# Patient Record
Sex: FEMALE
[De-identification: names, ages, dates, MRNs, and addresses within clinical notes are randomized per-mention and may not be internally consistent; named-entity substitution may affect disease eponyms.]

---

## 2021-02-04 ENCOUNTER — TRANSCRIPTION ENCOUNTER (OUTPATIENT)
Age: 26
End: 2021-02-04

## 2021-05-07 PROBLEM — Z00.00 ENCOUNTER FOR PREVENTIVE HEALTH EXAMINATION: Status: ACTIVE | Noted: 2021-05-07

## 2021-05-10 ENCOUNTER — APPOINTMENT (OUTPATIENT)
Dept: OBGYN | Facility: CLINIC | Age: 26
End: 2021-05-10
Payer: COMMERCIAL

## 2021-05-10 ENCOUNTER — NON-APPOINTMENT (OUTPATIENT)
Age: 26
End: 2021-05-10

## 2021-05-10 VITALS
WEIGHT: 160 LBS | SYSTOLIC BLOOD PRESSURE: 130 MMHG | DIASTOLIC BLOOD PRESSURE: 74 MMHG | BODY MASS INDEX: 26.66 KG/M2 | HEIGHT: 65 IN

## 2021-05-10 DIAGNOSIS — N93.9 ABNORMAL UTERINE AND VAGINAL BLEEDING, UNSPECIFIED: ICD-10-CM

## 2021-05-10 DIAGNOSIS — R10.2 PELVIC AND PERINEAL PAIN: ICD-10-CM

## 2021-05-10 DIAGNOSIS — F17.200 NICOTINE DEPENDENCE, UNSPECIFIED, UNCOMPLICATED: ICD-10-CM

## 2021-05-10 PROCEDURE — 87210 SMEAR WET MOUNT SALINE/INK: CPT | Mod: QW

## 2021-05-10 PROCEDURE — 99385 PREV VISIT NEW AGE 18-39: CPT

## 2021-05-10 PROCEDURE — 99072 ADDL SUPL MATRL&STAF TM PHE: CPT

## 2021-05-10 RX ORDER — TERCONAZOLE 80 MG/1
80 SUPPOSITORY VAGINAL
Qty: 1 | Refills: 3 | Status: ACTIVE | COMMUNITY
Start: 2021-05-10 | End: 1900-01-01

## 2021-05-11 PROBLEM — R10.2 PELVIC PAIN: Status: ACTIVE | Noted: 2021-05-10

## 2021-05-11 PROBLEM — N93.9 ABNORMAL UTERINE BLEEDING (AUB): Status: ACTIVE | Noted: 2021-05-10

## 2021-05-12 DIAGNOSIS — B96.89 ACUTE VAGINITIS: ICD-10-CM

## 2021-05-12 DIAGNOSIS — N76.0 ACUTE VAGINITIS: ICD-10-CM

## 2021-05-12 LAB
CANDIDA VAG CYTO: DETECTED
G VAGINALIS+PREV SP MTYP VAG QL MICRO: DETECTED
T VAGINALIS VAG QL WET PREP: NOT DETECTED

## 2021-05-12 RX ORDER — METRONIDAZOLE 500 MG/1
500 TABLET ORAL TWICE DAILY
Qty: 14 | Refills: 0 | Status: ACTIVE | COMMUNITY
Start: 2021-05-12 | End: 1900-01-01

## 2021-05-14 LAB — CYTOLOGY CVX/VAG DOC THIN PREP: ABNORMAL

## 2021-05-17 ENCOUNTER — APPOINTMENT (OUTPATIENT)
Dept: OBGYN | Facility: CLINIC | Age: 26
End: 2021-05-17
Payer: COMMERCIAL

## 2021-05-17 ENCOUNTER — ASOB RESULT (OUTPATIENT)
Age: 26
End: 2021-05-17

## 2021-05-17 PROCEDURE — 76830 TRANSVAGINAL US NON-OB: CPT

## 2021-05-17 PROCEDURE — 99072 ADDL SUPL MATRL&STAF TM PHE: CPT

## 2021-05-26 ENCOUNTER — APPOINTMENT (OUTPATIENT)
Dept: OBGYN | Facility: CLINIC | Age: 26
End: 2021-05-26
Payer: COMMERCIAL

## 2021-05-26 VITALS
HEIGHT: 65 IN | WEIGHT: 158 LBS | DIASTOLIC BLOOD PRESSURE: 86 MMHG | SYSTOLIC BLOOD PRESSURE: 137 MMHG | BODY MASS INDEX: 26.33 KG/M2

## 2021-05-26 DIAGNOSIS — B37.9 CANDIDIASIS, UNSPECIFIED: ICD-10-CM

## 2021-05-26 LAB
HCG UR QL: NEGATIVE
QUALITY CONTROL: YES

## 2021-05-26 PROCEDURE — 99214 OFFICE O/P EST MOD 30 MIN: CPT

## 2021-05-26 PROCEDURE — 87210 SMEAR WET MOUNT SALINE/INK: CPT | Mod: QW

## 2021-05-26 PROCEDURE — 81025 URINE PREGNANCY TEST: CPT

## 2021-05-26 PROCEDURE — 99072 ADDL SUPL MATRL&STAF TM PHE: CPT

## 2021-05-26 RX ORDER — FLUCONAZOLE 100 MG/1
100 TABLET ORAL
Qty: 7 | Refills: 3 | Status: ACTIVE | COMMUNITY
Start: 2021-05-26 | End: 1900-01-01

## 2021-05-26 NOTE — PHYSICAL EXAM
[Labia Majora] : normal [Labia Minora] : normal [Discharge] : a  ~M vaginal discharge was present [White] : white [Thick] : thick [Normal] : normal [Uterine Adnexae] : normal

## 2021-05-26 NOTE — HISTORY OF PRESENT ILLNESS
[FreeTextEntry1] : S/p treatment of monilia infection with Terazol III vaginal sup and then one Monostat 7 on 5/24/21 and BV with Flagyl 500 mg bid x 6 days but skipped two days because of nausea (had an alcoholic beverage) without any significant improvement.

## 2021-05-28 LAB
CANDIDA VAG CYTO: NOT DETECTED
G VAGINALIS+PREV SP MTYP VAG QL MICRO: DETECTED
T VAGINALIS VAG QL WET PREP: NOT DETECTED

## 2021-05-28 RX ORDER — METRONIDAZOLE 7.5 MG/G
0.75 GEL VAGINAL
Qty: 1 | Refills: 0 | Status: ACTIVE | COMMUNITY
Start: 2021-05-28 | End: 1900-01-01

## 2021-06-02 ENCOUNTER — APPOINTMENT (OUTPATIENT)
Dept: OBGYN | Facility: CLINIC | Age: 26
End: 2021-06-02

## 2021-12-21 ENCOUNTER — APPOINTMENT (OUTPATIENT)
Dept: OBGYN | Facility: CLINIC | Age: 26
End: 2021-12-21
Payer: COMMERCIAL

## 2021-12-21 ENCOUNTER — NON-APPOINTMENT (OUTPATIENT)
Age: 26
End: 2021-12-21

## 2021-12-21 VITALS
BODY MASS INDEX: 23.78 KG/M2 | SYSTOLIC BLOOD PRESSURE: 141 MMHG | HEIGHT: 66 IN | DIASTOLIC BLOOD PRESSURE: 81 MMHG | WEIGHT: 148 LBS

## 2021-12-21 DIAGNOSIS — N91.2 AMENORRHEA, UNSPECIFIED: ICD-10-CM

## 2021-12-21 PROCEDURE — 99213 OFFICE O/P EST LOW 20 MIN: CPT

## 2021-12-21 NOTE — PLAN
[FreeTextEntry1] : -Confirmation of pregnancy: UCG + TVUS to be done tomorrow.Pap up to date,  G/C done, pt to start gummy PNVs\par \par - N/V in pregnancy: counseled on small bland snacks, avoiding strong food smells, B6/Unison \par \par - Early Pregnancy counseling and Miscarraige precautions given\par \par GYN counseling: Patient instructed to call for Unusual Pelvic pain,  UTI symptoms, irregular vaginal bleeding/discharge- Patient verbalized agreement\par \par F/U: patient to follow up in 4 weeks for NT and 1st prenatal visit\par

## 2021-12-21 NOTE — HISTORY OF PRESENT ILLNESS
[FreeTextEntry1] : 27yo   LMP 10/24/21  presents for confirmation of pregnancy\par * unsure if desires pregnancy\par Reports Periods as irregular but  sure of LMP\par Patient reports daily nausea with vomitting a few times daily. Has not tried OTC remedies. Denies vaginal bleeding and cramping. reports mild vaginal irritation concerned for yeast\par \par Sono: dating sono scheduled for tomorrow\par \par ROS: Denies fever/chills, HA, Cough/sore throat, CP, SOB, N/V, Diarrhea/Constipation, Pelvic pain, Urinary frequency/ urgency/ Incontinence, irregular vaginal bleeding, discharge or irritation\par \par OBhx:G1 2020 sx TOP \par GYNhx: PCOS\par PMH:denies\par PSH: ED&C\par Meds: denies \par \par \par

## 2021-12-21 NOTE — PHYSICAL EXAM
[Chaperone Present] : A chaperone was present in the examining room during all aspects of the physical examination [Appropriately responsive] : appropriately responsive [Soft] : soft [Non-tender] : non-tender [Non-distended] : non-distended [Oriented x3] : oriented x3 [Normal] : normal

## 2021-12-22 ENCOUNTER — ASOB RESULT (OUTPATIENT)
Age: 26
End: 2021-12-22

## 2021-12-22 ENCOUNTER — APPOINTMENT (OUTPATIENT)
Dept: OBGYN | Facility: CLINIC | Age: 26
End: 2021-12-22
Payer: COMMERCIAL

## 2021-12-22 DIAGNOSIS — B96.89 ACUTE VAGINITIS: ICD-10-CM

## 2021-12-22 DIAGNOSIS — B37.3 CANDIDIASIS OF VULVA AND VAGINA: ICD-10-CM

## 2021-12-22 DIAGNOSIS — N76.0 ACUTE VAGINITIS: ICD-10-CM

## 2021-12-22 LAB
C TRACH RRNA SPEC QL NAA+PROBE: NOT DETECTED
CANDIDA VAG CYTO: DETECTED
G VAGINALIS+PREV SP MTYP VAG QL MICRO: DETECTED
N GONORRHOEA RRNA SPEC QL NAA+PROBE: NOT DETECTED
SOURCE AMPLIFICATION: NORMAL
T VAGINALIS VAG QL WET PREP: NOT DETECTED

## 2021-12-22 PROCEDURE — 76817 TRANSVAGINAL US OBSTETRIC: CPT

## 2021-12-22 RX ORDER — TERCONAZOLE 8 MG/G
0.8 CREAM VAGINAL
Qty: 1 | Refills: 0 | Status: ACTIVE | COMMUNITY
Start: 2021-12-22 | End: 1900-01-01

## 2021-12-22 RX ORDER — METRONIDAZOLE 7.5 MG/G
0.75 GEL VAGINAL
Qty: 1 | Refills: 0 | Status: ACTIVE | COMMUNITY
Start: 2021-12-22 | End: 1900-01-01

## 2021-12-28 ENCOUNTER — NON-APPOINTMENT (OUTPATIENT)
Age: 26
End: 2021-12-28

## 2022-01-18 ENCOUNTER — APPOINTMENT (OUTPATIENT)
Dept: OBGYN | Facility: CLINIC | Age: 27
End: 2022-01-18

## 2022-01-26 ENCOUNTER — LABORATORY RESULT (OUTPATIENT)
Age: 27
End: 2022-01-26

## 2022-01-27 ENCOUNTER — ASOB RESULT (OUTPATIENT)
Age: 27
End: 2022-01-27

## 2022-01-27 ENCOUNTER — APPOINTMENT (OUTPATIENT)
Dept: ANTEPARTUM | Facility: CLINIC | Age: 27
End: 2022-01-27
Payer: COMMERCIAL

## 2022-01-27 ENCOUNTER — APPOINTMENT (OUTPATIENT)
Dept: OBGYN | Facility: CLINIC | Age: 27
End: 2022-01-27
Payer: COMMERCIAL

## 2022-01-27 ENCOUNTER — LABORATORY RESULT (OUTPATIENT)
Age: 27
End: 2022-01-27

## 2022-01-27 VITALS
BODY MASS INDEX: 23.63 KG/M2 | DIASTOLIC BLOOD PRESSURE: 76 MMHG | WEIGHT: 147 LBS | HEIGHT: 66 IN | SYSTOLIC BLOOD PRESSURE: 122 MMHG

## 2022-01-27 DIAGNOSIS — Z34.90 ENCOUNTER FOR SUPERVISION OF NORMAL PREGNANCY, UNSPECIFIED, UNSPECIFIED TRIMESTER: ICD-10-CM

## 2022-01-27 PROCEDURE — 76813 OB US NUCHAL MEAS 1 GEST: CPT

## 2022-01-27 PROCEDURE — 76801 OB US < 14 WKS SINGLE FETUS: CPT

## 2022-01-27 PROCEDURE — 36416 COLLJ CAPILLARY BLOOD SPEC: CPT

## 2022-01-27 PROCEDURE — 0500F INITIAL PRENATAL CARE VISIT: CPT

## 2022-01-28 LAB
ABO + RH PNL BLD: NORMAL
ALBUMIN SERPL ELPH-MCNC: 4.1 G/DL
ALP BLD-CCNC: 77 U/L
ALT SERPL-CCNC: 12 U/L
ANION GAP SERPL CALC-SCNC: 13 MMOL/L
AST SERPL-CCNC: 15 U/L
BASOPHILS # BLD AUTO: 0.04 K/UL
BASOPHILS NFR BLD AUTO: 0.4 %
BILIRUB SERPL-MCNC: <0.2 MG/DL
BLD GP AB SCN SERPL QL: NORMAL
BUN SERPL-MCNC: 8 MG/DL
CALCIUM SERPL-MCNC: 8.9 MG/DL
CHLORIDE SERPL-SCNC: 103 MMOL/L
CO2 SERPL-SCNC: 20 MMOL/L
CREAT SERPL-MCNC: 0.65 MG/DL
EOSINOPHIL # BLD AUTO: 0.29 K/UL
EOSINOPHIL NFR BLD AUTO: 3 %
GLUCOSE SERPL-MCNC: 81 MG/DL
HBV SURFACE AG SER QL: NONREACTIVE
HCT VFR BLD CALC: 36.8 %
HCV AB SER QL: NONREACTIVE
HCV S/CO RATIO: 0.14 S/CO
HGB BLD-MCNC: 11.8 G/DL
IMM GRANULOCYTES NFR BLD AUTO: 0.5 %
LYMPHOCYTES # BLD AUTO: 1.99 K/UL
LYMPHOCYTES NFR BLD AUTO: 20.6 %
MAN DIFF?: NORMAL
MCHC RBC-ENTMCNC: 30.4 PG
MCHC RBC-ENTMCNC: 32.1 GM/DL
MCV RBC AUTO: 94.8 FL
MONOCYTES # BLD AUTO: 0.89 K/UL
MONOCYTES NFR BLD AUTO: 9.2 %
NEUTROPHILS # BLD AUTO: 6.41 K/UL
NEUTROPHILS NFR BLD AUTO: 66.3 %
PLATELET # BLD AUTO: 349 K/UL
POTASSIUM SERPL-SCNC: 3.9 MMOL/L
PROT SERPL-MCNC: 7 G/DL
RBC # BLD: 3.88 M/UL
RBC # FLD: 13.1 %
SODIUM SERPL-SCNC: 136 MMOL/L
WBC # FLD AUTO: 9.67 K/UL

## 2022-01-29 LAB
CMV IGG SERPL QL: 5.4 U/ML
CMV IGG SERPL-IMP: POSITIVE
CMV IGM SERPL QL: <8 AU/ML
CMV IGM SERPL QL: NEGATIVE
LEAD BLD-MCNC: <1 UG/DL
RUBV IGG FLD-ACNC: 3.2 INDEX
RUBV IGG SER-IMP: POSITIVE
T PALLIDUM AB SER QL IA: NEGATIVE
VZV AB TITR SER: POSITIVE
VZV IGG SER IF-ACNC: 340.5 INDEX

## 2022-02-01 LAB
B19V IGG SER QL IA: 6.46 INDEX
B19V IGG+IGM SER-IMP: NORMAL
B19V IGG+IGM SER-IMP: POSITIVE
B19V IGM FLD-ACNC: 0.11 INDEX
B19V IGM SER-ACNC: NEGATIVE

## 2022-02-03 LAB — AR GENE MUT ANL BLD/T: NORMAL

## 2022-02-09 ENCOUNTER — NON-APPOINTMENT (OUTPATIENT)
Age: 27
End: 2022-02-09

## 2022-02-09 LAB
CFTR MUT TESTED BLD/T: POSITIVE
FMR1 GENE MUT ANL BLD/T: NORMAL

## 2022-02-10 ENCOUNTER — NON-APPOINTMENT (OUTPATIENT)
Age: 27
End: 2022-02-10

## 2022-02-15 ENCOUNTER — APPOINTMENT (OUTPATIENT)
Dept: OBGYN | Facility: CLINIC | Age: 27
End: 2022-02-15
Payer: COMMERCIAL

## 2022-02-15 VITALS
RESPIRATION RATE: 16 BRPM | HEIGHT: 66 IN | HEART RATE: 102 BPM | WEIGHT: 149 LBS | DIASTOLIC BLOOD PRESSURE: 78 MMHG | BODY MASS INDEX: 23.95 KG/M2 | SYSTOLIC BLOOD PRESSURE: 130 MMHG

## 2022-02-15 DIAGNOSIS — A60.00 HERPESVIRAL INFECTION OF UROGENITAL SYSTEM, UNSPECIFIED: ICD-10-CM

## 2022-02-15 DIAGNOSIS — N39.0 URINARY TRACT INFECTION, SITE NOT SPECIFIED: ICD-10-CM

## 2022-02-15 DIAGNOSIS — N76.0 ACUTE VAGINITIS: ICD-10-CM

## 2022-02-15 PROCEDURE — 99214 OFFICE O/P EST MOD 30 MIN: CPT

## 2022-02-15 RX ORDER — NITROFURANTOIN MACROCRYSTALS 100 MG/1
100 CAPSULE ORAL
Qty: 10 | Refills: 0 | Status: ACTIVE | COMMUNITY
Start: 2022-02-15 | End: 1900-01-01

## 2022-02-15 RX ORDER — VALACYCLOVIR 500 MG/1
500 TABLET, FILM COATED ORAL
Qty: 30 | Refills: 0 | Status: ACTIVE | COMMUNITY
Start: 2022-02-15 | End: 1900-01-01

## 2022-02-15 NOTE — HISTORY OF PRESENT ILLNESS
[FreeTextEntry1] : 27yo female  presents for vaginal burning\par 10 days of vaginal irritation and white discharge. spotting dark on tissue admits to intercourse on the weekends. \par Discomfort but no cramping. \par \par Bedside Doppler FH: 150\par \par ROS: Denies fever/chills, HA, Cough/sore throat, CP, SOB, N/V, Diarrhea/Constipation, Pelvic pain, Urinary frequency/ urgency/ Incontinence, irregular discharge or vaginal bleeding\par \par \par \par \par \par \par \par \par

## 2022-02-15 NOTE — PLAN
[FreeTextEntry1] : Plan\par HSV: acyclovir for maintenance\par \par Vaginal discomfort: affirm swab \par \par UTI- Udip : trace leuks Macrobid, Ucx\par \par GYN counseling: Patient instructed to call for Unusual Pelvic pain,  UTI symptoms, irregular vaginal bleeding/discharge- Patient verbalized agreement\par \par F/U: patient to follow up as needed

## 2022-02-18 DIAGNOSIS — N76.0 ACUTE VAGINITIS: ICD-10-CM

## 2022-02-18 DIAGNOSIS — B96.89 ACUTE VAGINITIS: ICD-10-CM

## 2022-02-18 DIAGNOSIS — B37.9 CANDIDIASIS, UNSPECIFIED: ICD-10-CM

## 2022-02-18 RX ORDER — TERCONAZOLE 8 MG/G
0.8 CREAM VAGINAL
Qty: 1 | Refills: 1 | Status: ACTIVE | COMMUNITY
Start: 2022-02-18 | End: 1900-01-01

## 2022-02-18 RX ORDER — METRONIDAZOLE 7.5 MG/G
0.75 GEL VAGINAL
Qty: 1 | Refills: 0 | Status: ACTIVE | COMMUNITY
Start: 2022-02-18 | End: 1900-01-01

## 2022-02-20 LAB — BACTERIA UR CULT: NORMAL

## 2022-03-02 ENCOUNTER — NON-APPOINTMENT (OUTPATIENT)
Age: 27
End: 2022-03-02

## 2022-03-23 ENCOUNTER — NON-APPOINTMENT (OUTPATIENT)
Age: 27
End: 2022-03-23

## 2023-07-17 ENCOUNTER — APPOINTMENT (OUTPATIENT)
Dept: OBGYN | Facility: CLINIC | Age: 28
End: 2023-07-17
Payer: COMMERCIAL

## 2023-07-17 VITALS
BODY MASS INDEX: 28.51 KG/M2 | WEIGHT: 177.4 LBS | SYSTOLIC BLOOD PRESSURE: 121 MMHG | HEIGHT: 66 IN | DIASTOLIC BLOOD PRESSURE: 78 MMHG | HEART RATE: 60 BPM

## 2023-07-17 DIAGNOSIS — Z01.419 ENCOUNTER FOR GYNECOLOGICAL EXAMINATION (GENERAL) (ROUTINE) W/OUT ABNORMAL FINDINGS: ICD-10-CM

## 2023-07-17 DIAGNOSIS — Z30.41 ENCOUNTER FOR SURVEILLANCE OF CONTRACEPTIVE PILLS: ICD-10-CM

## 2023-07-17 PROCEDURE — 99395 PREV VISIT EST AGE 18-39: CPT

## 2023-07-17 RX ORDER — NORETHINDRONE 0.35 MG/1
0.35 TABLET ORAL DAILY
Qty: 3 | Refills: 3 | Status: ACTIVE | COMMUNITY
Start: 2023-07-17 | End: 1900-01-01

## 2023-07-17 NOTE — HISTORY OF PRESENT ILLNESS
[Patient reported PAP Smear was normal] : Patient reported PAP Smear was normal [Patient refuses STI testing] : Patient refuses STI testing [FreeTextEntry1] : 26 y/o  female, LMP: 7/10/23, presents for annual GYN visit.  Patient denies any GYN complaints. \par \par Menstrual Cycle: breastfeeding.\par Sexual Activity: monogamous with fiance.\par Contraception: POP\par Social/Mental Health: reports social ETOH and daily marijuana denies tobacco or any illicit drug use.  Denies depression, anxiety, thoughts of personal harm or suicidal ideation.\par \par ROS:  Denies fever/chills, HA, Cough/sore throat, CP, SOB, N/V, Diarrhea/Constipation, Pelvic pain,\par Urinary frequency/urgency/incontinence, irregular vaginal bleeding, discharge or irritation.  \par \par Medical History\par GYN HX: c/section 2022, hsv.\par PMH: denies\par PSH: c/section\par Meds: OCP\par Allergies: NKDA\par fam hx: denies [PapSmeardate] : 5/10/23

## 2023-07-17 NOTE — DISCUSSION/SUMMARY
[FreeTextEntry1] : thin prep w hpv reflex ordered.\par Patient denies DVT, PE, HTN, DM, overall healthy-OCP refilled. POP-breastfeeding\par RTO in 1 year or sooner for any gyn issues.\par Patient verbalized understanding.\par

## 2023-07-18 LAB
C TRACH RRNA SPEC QL NAA+PROBE: NOT DETECTED
N GONORRHOEA RRNA SPEC QL NAA+PROBE: NOT DETECTED
SOURCE TP AMPLIFICATION: NORMAL

## 2023-07-30 LAB — CYTOLOGY CVX/VAG DOC THIN PREP: NORMAL

## 2024-07-22 ENCOUNTER — NON-APPOINTMENT (OUTPATIENT)
Age: 29
End: 2024-07-22

## 2024-07-23 ENCOUNTER — APPOINTMENT (OUTPATIENT)
Dept: OBGYN | Facility: CLINIC | Age: 29
End: 2024-07-23
Payer: MEDICAID

## 2024-07-23 VITALS
HEIGHT: 66 IN | DIASTOLIC BLOOD PRESSURE: 76 MMHG | SYSTOLIC BLOOD PRESSURE: 112 MMHG | BODY MASS INDEX: 26.36 KG/M2 | WEIGHT: 164 LBS

## 2024-07-23 DIAGNOSIS — N93.9 ABNORMAL UTERINE AND VAGINAL BLEEDING, UNSPECIFIED: ICD-10-CM

## 2024-07-23 DIAGNOSIS — Z01.419 ENCOUNTER FOR GYNECOLOGICAL EXAMINATION (GENERAL) (ROUTINE) W/OUT ABNORMAL FINDINGS: ICD-10-CM

## 2024-07-23 LAB
HCG UR QL: NEGATIVE
QUALITY CONTROL: YES

## 2024-07-23 PROCEDURE — 81025 URINE PREGNANCY TEST: CPT

## 2024-07-23 PROCEDURE — 99213 OFFICE O/P EST LOW 20 MIN: CPT | Mod: 25

## 2024-07-23 PROCEDURE — 99395 PREV VISIT EST AGE 18-39: CPT | Mod: 25

## 2024-07-23 PROCEDURE — 99459 PELVIC EXAMINATION: CPT

## 2024-07-23 NOTE — PHYSICAL EXAM
[Chaperone Present] : A chaperone was present in the examining room during all aspects of the physical examination [21727] : A chaperone was present during the pelvic exam. [FreeTextEntry2] : Cindy Elizabeth [Appropriately responsive] : appropriately responsive [Alert] : alert [No Acute Distress] : no acute distress [No Lymphadenopathy] : no lymphadenopathy [Regular Rate Rhythm] : regular rate rhythm [No Murmurs] : no murmurs [Clear to Auscultation B/L] : clear to auscultation bilaterally [Soft] : soft [Non-tender] : non-tender [Non-distended] : non-distended [No HSM] : No HSM [No Lesions] : no lesions [No Mass] : no mass [Oriented x3] : oriented x3 [Examination Of The Breasts] : a normal appearance [Normal] : normal [No Masses] : no breast masses were palpable

## 2024-07-23 NOTE — DISCUSSION/SUMMARY
[FreeTextEntry1] : Examination and Pap smear was done.  UCG negative.  Patient recommended to have a transvaginal sonogram.  Contraception discussed with the patient.  Patient to continue keeping menstrual calendar.

## 2024-07-23 NOTE — HISTORY OF PRESENT ILLNESS
[PapSmeardate] : 2023 [Irregular Menstrual Interval] : irregular menstrual interval [Abnormal Quantity] : abnormal quantity [Abnormal Duration] : abnormal duration [Moderate Bleeding] : moderate bleeding [FreeTextEntry1] : 10 days ago

## 2024-07-25 ENCOUNTER — ASOB RESULT (OUTPATIENT)
Age: 29
End: 2024-07-25

## 2024-07-25 ENCOUNTER — APPOINTMENT (OUTPATIENT)
Dept: OBGYN | Facility: CLINIC | Age: 29
End: 2024-07-25
Payer: MEDICAID

## 2024-07-25 ENCOUNTER — NON-APPOINTMENT (OUTPATIENT)
Age: 29
End: 2024-07-25

## 2024-07-25 PROCEDURE — 76830 TRANSVAGINAL US NON-OB: CPT

## 2024-07-27 LAB — CYTOLOGY CVX/VAG DOC THIN PREP: NORMAL

## 2024-08-29 ENCOUNTER — APPOINTMENT (OUTPATIENT)
Dept: OBGYN | Facility: CLINIC | Age: 29
End: 2024-08-29

## 2024-10-24 ENCOUNTER — APPOINTMENT (OUTPATIENT)
Dept: OBGYN | Facility: CLINIC | Age: 29
End: 2024-10-24
Payer: MEDICAID

## 2024-10-24 ENCOUNTER — NON-APPOINTMENT (OUTPATIENT)
Age: 29
End: 2024-10-24

## 2024-10-24 DIAGNOSIS — Z11.3 ENCOUNTER FOR SCREENING FOR INFECTIONS WITH A PREDOMINANTLY SEXUAL MODE OF TRANSMISSION: ICD-10-CM

## 2024-10-24 PROCEDURE — 36415 COLL VENOUS BLD VENIPUNCTURE: CPT

## 2024-10-28 LAB
C TRACH RRNA SPEC QL NAA+PROBE: NOT DETECTED
HBV SURFACE AG SER QL: NONREACTIVE
HCV AB SER QL: NONREACTIVE
HCV S/CO RATIO: 0.12 S/CO
HIV1+2 AB SPEC QL IA.RAPID: NONREACTIVE
N GONORRHOEA RRNA SPEC QL NAA+PROBE: NOT DETECTED
SOURCE AMPLIFICATION: NORMAL
T PALLIDUM AB SER QL IA: NEGATIVE